# Patient Record
Sex: FEMALE | Race: WHITE | NOT HISPANIC OR LATINO | URBAN - METROPOLITAN AREA
[De-identification: names, ages, dates, MRNs, and addresses within clinical notes are randomized per-mention and may not be internally consistent; named-entity substitution may affect disease eponyms.]

---

## 2017-05-23 ENCOUNTER — OFFICE (OUTPATIENT)
Dept: URBAN - METROPOLITAN AREA CLINIC 78 | Facility: CLINIC | Age: 62
End: 2017-05-23

## 2017-05-23 VITALS
HEIGHT: 59 IN | DIASTOLIC BLOOD PRESSURE: 89 MMHG | WEIGHT: 191 LBS | HEART RATE: 79 BPM | SYSTOLIC BLOOD PRESSURE: 150 MMHG | TEMPERATURE: 97.4 F

## 2017-05-23 DIAGNOSIS — Z86.010 PERSONAL HISTORY OF COLONIC POLYPS: ICD-10-CM

## 2017-05-23 DIAGNOSIS — K21.9 GASTRO-ESOPHAGEAL REFLUX DISEASE WITHOUT ESOPHAGITIS: ICD-10-CM

## 2017-05-23 PROCEDURE — 99213 OFFICE O/P EST LOW 20 MIN: CPT

## 2017-05-23 NOTE — SERVICEHPINOTES
Ms. Epperson is here for follow up.   Her last EGD was in January of 2013 which showed gastritis and a hiatal hernia but no Mead's esophagus or h pylori. She is due for a colonoscopy for hx of polyps (last one was in 10/2011 given a 5 yr recall). Labs from May 2017 show a normal CBC. No known heart issues. She takes daily Dexilant in the am. By the afternoon, she may get heartburn again. She has tried all other PPIs and none work as well as Dexilant. She tried Zantac a few times but unsure if she took up to 300 mg as an adjunct. She believes Zantac hurt her stomach but she is unsure ? She has never tried Pepcid. No dysphagia, odynophagia, early satiety, nausea, vomiting.